# Patient Record
Sex: MALE | Race: WHITE | URBAN - METROPOLITAN AREA
[De-identification: names, ages, dates, MRNs, and addresses within clinical notes are randomized per-mention and may not be internally consistent; named-entity substitution may affect disease eponyms.]

---

## 2017-08-11 ENCOUNTER — OFFICE VISIT (OUTPATIENT)
Dept: INTERNAL MEDICINE CLINIC | Facility: CLINIC | Age: 22
End: 2017-08-11

## 2017-08-11 VITALS
HEIGHT: 75 IN | HEART RATE: 84 BPM | DIASTOLIC BLOOD PRESSURE: 72 MMHG | WEIGHT: 198 LBS | SYSTOLIC BLOOD PRESSURE: 122 MMHG | RESPIRATION RATE: 20 BRPM | TEMPERATURE: 98 F | BODY MASS INDEX: 24.62 KG/M2

## 2017-08-11 DIAGNOSIS — Z02.5 SPORTS PHYSICAL: ICD-10-CM

## 2017-08-11 DIAGNOSIS — Z00.00 ANNUAL PHYSICAL EXAM: Primary | ICD-10-CM

## 2017-08-11 PROCEDURE — 99385 PREV VISIT NEW AGE 18-39: CPT | Performed by: FAMILY MEDICINE

## 2017-08-11 NOTE — PATIENT INSTRUCTIONS
Prevention Guidelines, Men Ages 25 to 44  Screening tests and vaccines are an important part of managing your health. Health counseling is essential, too. Below are guidelines for these, for men ages 25 to 44.  Talk with your healthcare provider to make s Hepatitis B Men at increased risk for infection – talk with your healthcare provider 3 doses over 6 months; second dose should be given 1 month after the first dose; the third dose should be given at least 2 months after the second dose and at least 4 julissa Date Last Reviewed: 3/30/2015  © [id number] [address], [address] [place], [place] [address] AmboyDonald Delaney. All rights reserved. This information is not intended as a substitute for professional medical care.  Always follow your healthcare professional

## 2017-08-11 NOTE — PROGRESS NOTES
HPI:    Patient ID: Jodee Huang is a 23year old male. HPI Here for annual check-up/sports physical. Patient plays soccer for [employer].  Has played in the past with no issues- no chest pain/shortness of breath/lightheadedness/palpitations while pl person, place, and time. He appears well-developed and well-nourished. HENT:   Head: Normocephalic and atraumatic.    Right Ear: Tympanic membrane, external ear and ear canal normal.   Left Ear: Tympanic membrane, external ear and ear canal normal.   Mout